# Patient Record
Sex: MALE | Race: ASIAN | NOT HISPANIC OR LATINO | Employment: STUDENT | URBAN - METROPOLITAN AREA
[De-identification: names, ages, dates, MRNs, and addresses within clinical notes are randomized per-mention and may not be internally consistent; named-entity substitution may affect disease eponyms.]

---
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
## 2020-05-05 ENCOUNTER — APPOINTMENT (OUTPATIENT)
Dept: RADIOLOGY | Facility: IMAGING CENTER | Age: 20
End: 2020-05-05
Attending: FAMILY MEDICINE
Payer: COMMERCIAL

## 2020-05-05 ENCOUNTER — OFFICE VISIT (OUTPATIENT)
Dept: URGENT CARE | Facility: CLINIC | Age: 20
End: 2020-05-05
Payer: COMMERCIAL

## 2020-05-05 VITALS
BODY MASS INDEX: 24.25 KG/M2 | HEIGHT: 68 IN | HEART RATE: 82 BPM | TEMPERATURE: 99.1 F | DIASTOLIC BLOOD PRESSURE: 62 MMHG | RESPIRATION RATE: 16 BRPM | OXYGEN SATURATION: 100 % | SYSTOLIC BLOOD PRESSURE: 110 MMHG | WEIGHT: 160 LBS

## 2020-05-05 DIAGNOSIS — S63.501A SPRAIN OF RIGHT WRIST, INITIAL ENCOUNTER: ICD-10-CM

## 2020-05-05 PROCEDURE — 73110 X-RAY EXAM OF WRIST: CPT | Mod: TC,RT | Performed by: FAMILY MEDICINE

## 2020-05-05 PROCEDURE — 99214 OFFICE O/P EST MOD 30 MIN: CPT | Performed by: FAMILY MEDICINE

## 2020-05-05 NOTE — PATIENT INSTRUCTIONS
Wrist Sprain, Adult  A wrist sprain is a stretch or tear in the strong, fibrous tissues (ligaments) that connect your wrist bones. There are three types of wrist sprains:  · Grade 1. In this type of sprain, the ligament is stretched more than normal.  · Grade 2. In this type of sprain, the ligament is partially torn. You may be able to move your wrist, but not very much.  · Grade 3. In this type of sprain, the ligament or muscle is completely torn. You may find it difficult or extremely painful to move your wrist even a little.  What are the causes?  A wrist sprain can be caused by using the wrist too much during sports, exercise, or at work. It can also happen with a fall or during an accident.  What increases the risk?  This condition is more likely to occur in people:  · With a previous wrist or arm injury.  · With poor wrist strength and flexibility.  · Who play contact sports, such as football or soccer.  · Who play sports that may result in a fall, such as skateboarding, biking, skiing, or snowboarding.  · Who do not exercise regularly.  · Who use exercise equipment that does not fit well.  What are the signs or symptoms?  Symptoms of this condition include:  · Pain in the wrist, arm, or hand.  · Swelling or bruised skin near the wrist, hand, or arm. The skin may look yellow or kind of blue.  · Stiffness or trouble moving the hand.  · Hearing a pop or feeling a tear at the time of the injury.  · A warm feeling in the skin around the wrist.  How is this diagnosed?  This condition is diagnosed with a physical exam. Sometimes an X-ray is taken to make sure a bone did not break. If your health care provider thinks that you tore a ligament, he or she may order an MRI of your wrist.  How is this treated?  This condition is treated by resting and applying ice to your wrist. Additional treatment may include:  · Medicine for pain and inflammation.  · A splint to keep your wrist still (immobilized).  · Exercises to  strengthen and stretch your wrist.  · Surgery. This may be done if the ligament is completely torn.  Follow these instructions at home:  If you have a splint:  · Do not put pressure on any part of the splint until it is fully hardened. This may take several hours.  · Wear the splint as told by your health care provider. Remove it only as told by your health care provider.  · Loosen the splint if your fingers tingle, become numb, or turn cold and blue.  · If your splint is not waterproof:  ¨ Do not let it get wet.  ¨ Cover it with a watertight covering when you take a bath or a shower.  · Keep the splint clean.  Managing pain, stiffness, and swelling  · If directed, put ice on the injured area.  ¨ If you have a removable splint, remove it as told by your health care provider.  ¨ Put ice in a plastic bag.  ¨ Place a towel between your skin and the bag or between the splint and the bag.  ¨ Leave the ice on for 20 minutes, 2-3 times per day.  · Move your fingers often to avoid stiffness and to lessen swelling.  · Raise (elevate) the injured area above the level of your heart while you are sitting or lying down.  Activity  · Rest your wrist. Do not do things that cause pain.  · Return to your normal activities as told by your health care provider. Ask your health care provider what activities are safe for you.  · Do exercises as told by your health care provider.  General instructions  · Take over-the-counter and prescription medicines only as told by your health care provider.  · Do not use any products that contain nicotine or tobacco, such as cigarettes and e-cigarettes. These can delay healing. If you need help quitting, ask your health care provider.  · Ask your health care provider when it is safe to drive if you have a splint.  · Keep all follow-up visits as told by your health care provider. This is important.  Contact a health care provider if:  · Your pain, bruising, or swelling gets worse.  · Your skin becomes  red, gets a rash, or has open sores.  · Your pain does not get better or it gets worse.  Get help right away if:  · You have a new or sudden sharp pain in the hand, arm, or wrist.  · You have tingling or numbness in your hand.  · Your fingers turn white, very red, or cold and blue.  · You cannot move your fingers.  This information is not intended to replace advice given to you by your health care provider. Make sure you discuss any questions you have with your health care provider.  Document Released: 08/21/2015 Document Revised: 07/15/2017 Document Reviewed: 07/06/2017  [employer] Interactive Patient Education © 2017 [employer]    Wrist Sprain Rehab  Ask your health care provider which exercises are safe for you. Do exercises exactly as told by your health care provider and adjust them as directed. It is normal to feel mild stretching, pulling, tightness, or discomfort as you do these exercises, but you should stop right away if you feel sudden pain or your pain gets worse. Do not begin these exercises until told by your health care provider.  Stretching and range of motion exercises  These exercises warm up your muscles and joints and improve the movement and flexibility of your wrist. These exercises also help to relieve pain, numbness, and tingling.  Exercise A: Wrist flexion, active  1. Extend your left / right arm in front of you, and point your fingers downward.  2. If told by your health care provider, bend your left / right arm.  3. Try to bring your palm toward your forearm as far as you can without pain. You should feel a gentle stretch on the top of your forearm and wrist.  4. Hold this position for __________ seconds.  5. Slowly return to the starting position.  Repeat __________ times. Complete this exercise __________ times a day.  Exercise B: Wrist extension, active  1. Extend your left / right arm in front of you and turn your palm upward.  2. If told by your health care provider, bend your left /  "right arm.  3. Bring your palm and fingertips back so your fingers point downward. You should feel a gentle stretch on the inside of your forearm and wrist.  4. Hold this position for __________ seconds.  5. Slowly return to the starting position.  Repeat __________ times. Complete this exercise __________ times a day.  Exercise C: Supination, active  1. Stand or sit with your arms at your sides.  2. Bend your left / right elbow to an \"L\" shape (90 degrees).  3. Turn your palm upward until you feel a gentle stretch in the inside of your forearm.  4. Hold this position for __________ seconds.  5. Slowly return your palm to the starting position.  Repeat __________ times. Complete this stretch __________ times a day.  Exercise D: Pronation, active  1. Stand or sit with your arms at your sides.  2. Bend your left / right elbow to an \"L\" shape (90 degrees).  3. Turn your palm downward until you feel a gentle stretch in the top of your forearm.  4. Hold this position for __________ seconds.  5. Slowly return your palm to the starting position.  Repeat __________ times. Complete this stretch __________ times a day.  Strengthening exercises  These exercises build strength and endurance in your wrist. Endurance is the ability to use your muscles for a long time, even after they get tired.  Exercise E: Wrist flexors  1. Sit with your left / right forearm supported on a table and your hand resting palm-up over the edge of the table. Your elbow should be below the level of your shoulder.  2. Hold a __________ weight in your left / right hand. Or, hold a rubber exercise band or tube in both hands, keeping your hands at the same level and hip distance apart. There should be a slight tension in the exercise band or tube.  3. Slowly curl your hand up toward your forearm.  4. Hold this position for __________ seconds.  5. Slowly lower your hand back to the starting position.  Repeat __________ times. Complete this exercise " __________ times a day.  Exercise F: Wrist extensors  1. Sit with your left / right forearm supported on a table and your hand resting palm-down over the edge of the table. Your elbow should be below the level of your shoulder.  2. Hold a __________ weight in your left / right hand. Or, hold a rubber exercise band or tube in both hands, keeping your hands at the same level and hip distance apart. There should be a slight tension in the exercise band or tube.  3. Slowly curl your hand up toward your forearm.  4. Hold this position for __________ seconds.  5. Slowly lower your hand to the starting position.  Repeat __________ times. Complete this exercise __________ times a day.  This information is not intended to replace advice given to you by your health care provider. Make sure you discuss any questions you have with your health care provider.  Document Released: 12/18/2006 Document Revised: 08/23/2017 Document Reviewed: 09/03/2016  [employer] Interactive Patient Education © 2017 [employer]

## 2020-05-06 NOTE — PROGRESS NOTES
"Subjective:      Donovan Vargas is a 20 y.o. male who presents with Wrist Injury (x2 days, right wrist pain after working out at gym)            This is a new problem.  20-year-old otherwise healthy presenting for right wrist pain after he was doing some weightlifting which she does frequently during the week he has noticed some pain which has continued since yesterday.  No history of fracture in the past.  He has been using over-the-counter splint.  He has not taken anything over-the-counter for pain.  Pertinent review of system otherwise negative      ROS       Objective:     /62   Pulse 82   Temp 37.3 °C (99.1 °F) (Temporal)   Resp 16   Ht 1.727 m (5' 8\")   Wt 72.6 kg (160 lb)   SpO2 100%   BMI 24.33 kg/m²      Physical Exam  Constitutional:       General: He is not in acute distress.     Appearance: He is not ill-appearing, toxic-appearing or diaphoretic.   HENT:      Head: Normocephalic and atraumatic.      Right Ear: External ear normal.      Left Ear: External ear normal.   Cardiovascular:      Rate and Rhythm: Normal rate.   Pulmonary:      Effort: Pulmonary effort is normal. No respiratory distress.      Breath sounds: No stridor.   Musculoskeletal:      Right wrist: He exhibits tenderness (Mild tenderness on palpation of the distal radius). He exhibits normal range of motion (Otherwise full range of motion the right wrist), no swelling, no effusion, no crepitus, no deformity and no laceration.      Left wrist: Normal.   Skin:     Coloration: Skin is not jaundiced or pale.   Neurological:      Mental Status: He is alert and oriented to person, place, and time.   Psychiatric:         Mood and Affect: Mood normal.       X-ray of the right wrist is negative for acute abnormalities          Assessment/Plan:       1. Sprain of right wrist, initial encounter  - DX-WRIST-COMPLETE 3+ RIGHT; Future  - Wrist Splint      I gave him a different more appropriate wrist splint.  We discussed NSAIDs use.  " Icing frequently and gentle range of motion.  Resting  Follow up if not significantly improved as expected in 7-10 days, sooner if any worsening or new symptoms  Plan per orders and instructions  Warning signs reviewed

## 2021-02-24 ENCOUNTER — OFFICE VISIT (OUTPATIENT)
Dept: URGENT CARE | Facility: CLINIC | Age: 21
End: 2021-02-24
Payer: COMMERCIAL

## 2021-02-24 VITALS
TEMPERATURE: 97.1 F | RESPIRATION RATE: 16 BRPM | HEART RATE: 70 BPM | BODY MASS INDEX: 23.7 KG/M2 | HEIGHT: 68 IN | OXYGEN SATURATION: 97 % | SYSTOLIC BLOOD PRESSURE: 112 MMHG | DIASTOLIC BLOOD PRESSURE: 82 MMHG | WEIGHT: 156.4 LBS

## 2021-02-24 DIAGNOSIS — J02.0 STREP PHARYNGITIS: ICD-10-CM

## 2021-02-24 PROBLEM — H90.3 SENSORINEURAL HEARING LOSS, BILATERAL: Status: ACTIVE | Noted: 2019-06-25

## 2021-02-24 LAB
INT CON NEG: NORMAL
INT CON POS: NORMAL
S PYO AG THROAT QL: POSITIVE

## 2021-02-24 PROCEDURE — 87880 STREP A ASSAY W/OPTIC: CPT | Performed by: PHYSICIAN ASSISTANT

## 2021-02-24 PROCEDURE — 99213 OFFICE O/P EST LOW 20 MIN: CPT | Performed by: PHYSICIAN ASSISTANT

## 2021-02-24 RX ORDER — AMOXICILLIN 500 MG/1
500 CAPSULE ORAL 2 TIMES DAILY
Qty: 20 CAPSULE | Refills: 0 | Status: SHIPPED | OUTPATIENT
Start: 2021-02-24 | End: 2021-03-06

## 2021-02-24 ASSESSMENT — ENCOUNTER SYMPTOMS
COUGH: 0
ABDOMINAL PAIN: 0
NAUSEA: 0
SORE THROAT: 1
WHEEZING: 0
VOMITING: 0
FEVER: 0
SHORTNESS OF BREATH: 0
CHILLS: 0
DIARRHEA: 0
SPUTUM PRODUCTION: 0

## 2021-02-24 NOTE — PROGRESS NOTES
"Subjective:   Donovan Vargas  is a 21 y.o. male who presents for Sinus Problem (x 1 month, nasal congestion, stuffy nose, swollen glands and pain to swallow)      Sinus Problem  This is a new problem. The current episode started more than 1 month ago. The problem has been waxing and waning since onset. Associated symptoms include congestion and a sore throat. Pertinent negatives include no chills, coughing, ear pain or shortness of breath.   Patient comes clinic complaining of sore throat sinus congestion and full lymph nodes times last 3 to 4 weeks.  He notes some worsening over the last 1 to 2 weeks.  Complains of sore throat worse in the morning.  Denies fevers chills.  Denies cough.  Denies loss of taste or smell.  Denies ear pain.  Complains of sinus congestion and postnasal drainage.  Denies nausea vomiting abdominal pain diarrhea or rash.  Notes past medical history of seasonal allergies, takes loratadine when bad but has not been taking recently.  Denies history of asthma bronchitis or pneumonia.  Denies exposure to individuals with COVID-19.  Patient was positive for Covid months ago and declines further testing today.    Review of Systems   Constitutional: Negative for chills and fever.   HENT: Positive for congestion and sore throat. Negative for ear pain.    Respiratory: Negative for cough, sputum production, shortness of breath and wheezing.    Gastrointestinal: Negative for abdominal pain, diarrhea, nausea and vomiting.   Skin: Negative for rash.   Endo/Heme/Allergies: Positive for environmental allergies.       Allergies   Allergen Reactions   • Codeine-Promethazine [Promethazine Plain-Codeine] Rash     Rash   • Codeine      Rash     • Promethazine      Rash          Objective:   /82 (BP Location: Left arm, Patient Position: Sitting, BP Cuff Size: Adult)   Pulse 70   Temp 36.2 °C (97.1 °F) (Temporal)   Resp 16   Ht 1.727 m (5' 8\")   Wt 70.9 kg (156 lb 6.4 oz)   SpO2 97%   BMI 23.78 " "kg/m²     Physical Exam  Vitals and nursing note reviewed.   Constitutional:       General: He is not in acute distress.     Appearance: He is well-developed. He is not diaphoretic.   HENT:      Head: Normocephalic and atraumatic.      Right Ear: Tympanic membrane, ear canal and external ear normal.      Left Ear: Tympanic membrane, ear canal and external ear normal.      Nose: Nose normal.      Mouth/Throat:      Lips: Pink.      Mouth: Mucous membranes are moist.      Pharynx: Uvula midline. Posterior oropharyngeal erythema (deep) present. No pharyngeal swelling or oropharyngeal exudate.      Tonsils: No tonsillar exudate or tonsillar abscesses. 0 on the right. 0 on the left.   Eyes:      General: No scleral icterus.        Right eye: No discharge.         Left eye: No discharge.      Conjunctiva/sclera: Conjunctivae normal.   Pulmonary:      Effort: Pulmonary effort is normal. No respiratory distress.      Breath sounds: No decreased breath sounds, wheezing, rhonchi or rales.   Musculoskeletal:         General: Normal range of motion.      Cervical back: Neck supple.   Lymphadenopathy:      Cervical: Cervical adenopathy ( mild bilat L>R) present.   Skin:     General: Skin is warm and dry.      Coloration: Skin is not pale.   Neurological:      Mental Status: He is alert and oriented to person, place, and time.      Coordination: Coordination normal.     POCT strep - POS    Patient states that strep has been \"going around his fraternity\".    Assessment/Plan:   1. Strep pharyngitis  - POCT Rapid Strep A  - amoxicillin (AMOXIL) 500 MG Cap; Take 1 capsule by mouth 2 times a day for 10 days.  Dispense: 20 capsule; Refill: 0  Supportive care is reviewed with patient/caregiver - recommend to push PO fluids and electrolytes,  take full course of Rx, take with probiotics, observe for resolution  Return to clinic with lack of resolution or progression of symptoms.  OTC NSAIDs/Tylenol, timeframe of contagion, throw away " toothbrush  Declines Covid testing    I have worn an N95 mask, gloves and eye protection for the entire encounter with this patient.     Differential diagnosis, natural history, supportive care, and indications for immediate follow-up discussed.

## 2024-03-19 ENCOUNTER — HOSPITAL ENCOUNTER (EMERGENCY)
Facility: MEDICAL CENTER | Age: 24
End: 2024-03-19
Attending: EMERGENCY MEDICINE
Payer: COMMERCIAL

## 2024-03-19 ENCOUNTER — APPOINTMENT (OUTPATIENT)
Dept: RADIOLOGY | Facility: MEDICAL CENTER | Age: 24
End: 2024-03-19
Attending: EMERGENCY MEDICINE
Payer: COMMERCIAL

## 2024-03-19 VITALS
WEIGHT: 167.99 LBS | HEART RATE: 70 BPM | BODY MASS INDEX: 25.46 KG/M2 | RESPIRATION RATE: 16 BRPM | HEIGHT: 68 IN | TEMPERATURE: 97.3 F | OXYGEN SATURATION: 99 % | SYSTOLIC BLOOD PRESSURE: 112 MMHG | DIASTOLIC BLOOD PRESSURE: 62 MMHG

## 2024-03-19 DIAGNOSIS — S16.1XXA STRAIN OF NECK MUSCLE, INITIAL ENCOUNTER: ICD-10-CM

## 2024-03-19 DIAGNOSIS — S09.90XA CLOSED HEAD INJURY, INITIAL ENCOUNTER: ICD-10-CM

## 2024-03-19 PROCEDURE — 70450 CT HEAD/BRAIN W/O DYE: CPT

## 2024-03-19 PROCEDURE — 72125 CT NECK SPINE W/O DYE: CPT

## 2024-03-19 PROCEDURE — 99283 EMERGENCY DEPT VISIT LOW MDM: CPT
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
## 2024-03-19 NOTE — ED NOTES
Pt ambulatory to room PUR 81 without incident. Pt reports being light headed with blurred vision. Pt resting in bed, chart up for ERP.

## 2024-03-19 NOTE — DISCHARGE INSTRUCTIONS
Return to any ER for severe headache, vision changes, nausea/vomiting, change in personality or mental alertness, arm or leg numbness or weakness, or any new symptoms.    Concussion, Adult    A concussion is a brain injury from a hard, direct hit (trauma) to the head or body. This direct hit causes the brain to shake quickly back and forth inside the skull. This can damage brain cells and cause chemical changes in the brain. A concussion may also be known as a mild traumatic brain injury (TBI).  Concussions are usually not life-threatening, but the effects of a concussion can be serious. If you have a concussion, you should be very careful to avoid having a second concussion.  What are the causes?  This condition is caused by:  A direct hit to your head, such as:  Running into another player during a game.  Being hit in a fight.  Hitting your head on a hard surface.  Sudden movement of your body that causes your brain to move back and forth inside the skull, such as in a car crash.  What are the signs or symptoms?  The signs of a concussion can be hard to notice. Early on, they may be missed by you, family members, and health care providers. You may look fine on the outside but may act or feel differently.  Every head injury is different. Symptoms are usually temporary but may last for days, weeks, or even months. Some symptoms appear right away, but other symptoms may not show up for hours or days. If your symptoms last longer than normal, you may have post-concussion syndrome.  Physical symptoms  Headaches.  Dizziness and problems with coordination or balance.  Sensitivity to light or noise.  Nausea or vomiting.  Tiredness (fatigue).  Vision or hearing problems.  Changes in eating or sleeping patterns.  Seizure.  Mental and emotional symptoms  Irritability or mood changes.  Memory problems.  Trouble concentrating, organizing, or making decisions.  Slowness in thinking, acting or reacting, speaking, or  reading.  Anxiety or depression.  How is this diagnosed?  This condition is diagnosed based on:  Your symptoms.  A description of your injury.  You may also have tests, including:  Imaging tests, such as a CT scan or an MRI.  Neuropsychological tests. These measure your thinking, understanding, learning, and remembering abilities.  How is this treated?  Treatment for this condition includes:  Stopping sports or activity if you are injured. If you hit your head or show signs of concussion:  Do not return to sports or activities the same day.  Get checked by a health care provider before you return to your activities.  Physical and mental rest and careful observation, usually at home. Gradually return to your normal activities.  Medicines to help with symptoms such as headaches, nausea, or difficulty sleeping.  Avoid taking opioid pain medicine while recovering from a concussion.  Avoiding alcohol and drugs. These may slow your recovery and can put you at risk of further injury.  Referral to a concussion clinic or rehabilitation center.  Recovery from a concussion can take time. How fast you recover depends on many factors. Return to activities only when:  Your symptoms are completely gone.  Your health care provider says that it is safe.  Follow these instructions at home:  Activity  Limit activities that require a lot of thought or concentration, such as:  Doing homework or job-related work.  Watching TV.  Working on the computer or phone.  Playing memory games and puzzles.  Rest. Rest helps your brain heal. Make sure you:  Get plenty of sleep. Most adults should get 7-9 hours of sleep each night.  Rest during the day. Take naps or rest breaks when you feel tired.  Avoid physical activity like exercise until your health care provider says it is safe. Stop any activity that worsens symptoms.  Do not do high-risk activities that could cause a second concussion, such as riding a bike or playing sports.  Ask your health  care provider when you can return to your normal activities, such as school, work, athletics, and driving. Your ability to react may be slower after a brain injury. Never do these activities if you are dizzy. Your health care provider will likely give you a plan for gradually returning to activities.  General instructions    Take over-the-counter and prescription medicines only as told by your health care provider. Some medicines, such as blood thinners (anticoagulants) and aspirin, may increase the risk for complications, such as bleeding.  Do not drink alcohol until your health care provider says you can.  Watch your symptoms and tell others around you to do the same. Complications sometimes occur after a concussion. Older adults with a brain injury may have a higher risk of serious complications.  Tell your , teachers, school nurse, school counselor, , or  about your injury, symptoms, and restrictions.  Keep all follow-up visits as told by your health care provider. This is important.  How is this prevented?  Avoiding another brain injury is very important. In rare cases, another injury can lead to permanent brain damage, brain swelling, or death. The risk of this is greatest during the first 7-10 days after a head injury. Avoid injuries by:  Stopping activities that could lead to a second concussion, such as contact or recreational sports, until your health care provider says it is okay.  Taking these actions once you have returned to sports or activities:  Avoiding plays or moves that can cause you to crash into another person. This is how most concussions occur.  Following the rules and being respectful of other players. Do not engage in violent or illegal plays.  Getting regular exercise that includes strength and balance training.  Wearing a properly fitting helmet during sports, biking, or other activities. Helmets can help protect you from serious skull and brain injuries,  but they may not protect you from a concussion. Even when wearing a helmet, you should avoid being hit in the head.  Contact a health care provider if:  Your symptoms do not improve.  You have new symptoms.  You have another injury.  Get help right away if:  You have new or worsening physical symptoms, such as:  A severe or worsening headache.  Weakness or numbness in any part of your body, slurred speech, vision changes, or confusion.  Your coordination gets worse.  Vomiting repeatedly.  You have a seizure.  You have unusual behavior changes.  You lose consciousness, are sleepier than normal, or are difficult to wake up.  These symptoms may represent a serious problem that is an emergency. Do not wait to see if the symptoms will go away. Get medical help right away. Call your local emergency services (911 in the [place] Do not drive yourself to the hospital.  Summary  A concussion is a brain injury that results from a hard, direct hit (trauma) to your head or body.  You may have imaging tests and neuropsychological tests to diagnose a concussion.  Treatment for this condition includes physical and mental rest and careful observation.  Ask your health care provider when you can return to your normal activities, such as school, work, athletics, and driving.  Get help right away if you have a severe headache, weakness in any part of the body, seizures, behavior changes, changes in vision, or if you are confused or sleepier than normal.  This information is not intended to replace advice given to you by your health care provider. Make sure you discuss any questions you have with your health care provider.  Document Revised: 03/03/2022 Document Reviewed: 03/03/2022